# Patient Record
Sex: MALE | Race: BLACK OR AFRICAN AMERICAN | Employment: FULL TIME | URBAN - METROPOLITAN AREA
[De-identification: names, ages, dates, MRNs, and addresses within clinical notes are randomized per-mention and may not be internally consistent; named-entity substitution may affect disease eponyms.]

---

## 2024-06-12 ENCOUNTER — HOSPITAL ENCOUNTER (EMERGENCY)
Facility: CLINIC | Age: 49
Discharge: HOME | End: 2024-06-12
Attending: INTERNAL MEDICINE | Admitting: INTERNAL MEDICINE
Payer: COMMERCIAL

## 2024-06-12 VITALS
SYSTOLIC BLOOD PRESSURE: 164 MMHG | RESPIRATION RATE: 20 BRPM | OXYGEN SATURATION: 98 % | DIASTOLIC BLOOD PRESSURE: 95 MMHG | HEART RATE: 73 BPM | TEMPERATURE: 98.3 F

## 2024-06-12 DIAGNOSIS — R42 DIZZINESS: ICD-10-CM

## 2024-06-12 PROCEDURE — 99282 EMERGENCY DEPT VISIT SF MDM: CPT | Performed by: INTERNAL MEDICINE

## 2024-06-12 PROCEDURE — 99283 EMERGENCY DEPT VISIT LOW MDM: CPT | Performed by: INTERNAL MEDICINE

## 2024-06-12 ASSESSMENT — COLUMBIA-SUICIDE SEVERITY RATING SCALE - C-SSRS
1. IN THE PAST MONTH, HAVE YOU WISHED YOU WERE DEAD OR WISHED YOU COULD GO TO SLEEP AND NOT WAKE UP?: NO
2. HAVE YOU ACTUALLY HAD ANY THOUGHTS OF KILLING YOURSELF IN THE PAST MONTH?: NO
6. HAVE YOU EVER DONE ANYTHING, STARTED TO DO ANYTHING, OR PREPARED TO DO ANYTHING TO END YOUR LIFE?: NO

## 2024-06-12 ASSESSMENT — ACTIVITIES OF DAILY LIVING (ADL)
ADLS_ACUITY_SCORE: 35
ADLS_ACUITY_SCORE: 35

## 2024-06-13 ENCOUNTER — HOSPITAL ENCOUNTER (EMERGENCY)
Facility: CLINIC | Age: 49
Discharge: HOME | End: 2024-06-13
Attending: STUDENT IN AN ORGANIZED HEALTH CARE EDUCATION/TRAINING PROGRAM | Admitting: STUDENT IN AN ORGANIZED HEALTH CARE EDUCATION/TRAINING PROGRAM
Payer: COMMERCIAL

## 2024-06-13 VITALS
SYSTOLIC BLOOD PRESSURE: 138 MMHG | RESPIRATION RATE: 18 BRPM | DIASTOLIC BLOOD PRESSURE: 84 MMHG | HEART RATE: 68 BPM | OXYGEN SATURATION: 99 % | TEMPERATURE: 97.9 F

## 2024-06-13 DIAGNOSIS — H81.10 BENIGN PAROXYSMAL POSITIONAL VERTIGO, UNSPECIFIED LATERALITY: ICD-10-CM

## 2024-06-13 LAB
HOLD SPECIMEN: NORMAL

## 2024-06-13 PROCEDURE — 99284 EMERGENCY DEPT VISIT MOD MDM: CPT | Performed by: STUDENT IN AN ORGANIZED HEALTH CARE EDUCATION/TRAINING PROGRAM

## 2024-06-13 PROCEDURE — 99283 EMERGENCY DEPT VISIT LOW MDM: CPT | Performed by: STUDENT IN AN ORGANIZED HEALTH CARE EDUCATION/TRAINING PROGRAM

## 2024-06-13 RX ORDER — MECLIZINE HYDROCHLORIDE 25 MG/1
25 TABLET ORAL 3 TIMES DAILY PRN
Qty: 30 TABLET | Refills: 0 | Status: SHIPPED | OUTPATIENT
Start: 2024-06-13

## 2024-06-13 ASSESSMENT — ACTIVITIES OF DAILY LIVING (ADL): ADLS_ACUITY_SCORE: 35

## 2024-06-13 ASSESSMENT — COLUMBIA-SUICIDE SEVERITY RATING SCALE - C-SSRS
2. HAVE YOU ACTUALLY HAD ANY THOUGHTS OF KILLING YOURSELF IN THE PAST MONTH?: NO
6. HAVE YOU EVER DONE ANYTHING, STARTED TO DO ANYTHING, OR PREPARED TO DO ANYTHING TO END YOUR LIFE?: NO
1. IN THE PAST MONTH, HAVE YOU WISHED YOU WERE DEAD OR WISHED YOU COULD GO TO SLEEP AND NOT WAKE UP?: NO

## 2024-06-13 NOTE — ED PROVIDER NOTES
[facility] [place] [facility] ([facility])    6/12/24          History     Chief Complaint   Patient presents with    Dizziness     HPI  Magdaleno Gordillo is a 49 year old male who with PMH of adenomyomatosis of gall bladder, BPH, diverticulitis, ADHD, hyperlipidemia and obesity presents to the ED due to dizziness.     Patient reports that he was at ER couple of days ago.  He had gone because his vision was blurry and he had fainted at work.  They reported that his blood sugar was good but his BP was slightly elevated but it had come back to a normal level.  This was possibly due to dehydration so he was put on IV.  He has another appointment on Friday but it is in [facility].      Patient was at [place] today and had gotten dizzy. He was picked up by his son who is homeless.  He had given him a cigarette and had felt lightheaded. He started to smoke again but he had quit last month. Also, patient goes to boxing frequently.  He works out a lot. Last Saturday he was doing a simple workout and was doing leg ups, but he could not catch himself one time.  Patient had anxiety that day and the next day he was going to work.  He got fuzzy and his vision went blurry.      Past Medical History  Past Medical History:   Diagnosis Date    Enlarged prostate     Fatty liver     Gout      Past Surgical History:   Procedure Laterality Date    COLONOSCOPY       No current outpatient medications on file.    Allergies   Allergen Reactions    Penicillins Rash     Family History  History reviewed. No pertinent family history.  Social History   Social History     Tobacco Use    Smoking status: Every Day     Types: Cigarettes    Smokeless tobacco: Never   Substance Use Topics    Alcohol use: Never    Drug use: Never      Past medical history, past surgical history, medications, allergies, family history, and social history were reviewed with the patient. No additional pertinent items.   A complete review of systems  was performed with pertinent positives and negatives noted in the HPI, and all other systems negative.    Physical Exam   BP: (!) 164/95  Pulse: 73  Temp: 98.3  F (36.8  C)  Resp: 20  SpO2: 98 %  Lying Orthostatic BP: 152/83  Lying Orthostatic Pulse: 65 bpm  Sitting Orthostatic BP: 168/93  Sitting Orthostatic Pulse: 70 bpm  Standing Orthostatic BP: 161/101  Standing Orthostatic Pulse: 71 bpm  Physical Exam  Constitutional:       General: He is not in acute distress.     Appearance: He is not diaphoretic.   HENT:      Head: Atraumatic.   Eyes:      General: No scleral icterus.     Pupils: Pupils are equal, round, and reactive to light.   Cardiovascular:      Rate and Rhythm: Normal rate and regular rhythm.      Heart sounds: Normal heart sounds. No murmur heard.     No friction rub. No gallop.   Pulmonary:      Effort: Pulmonary effort is normal. No respiratory distress.      Breath sounds: Normal breath sounds. No stridor. No wheezing, rhonchi or rales.   Chest:      Chest wall: No tenderness.   Abdominal:      General: Abdomen is flat. Bowel sounds are normal. There is no distension.      Palpations: Abdomen is soft. There is no mass.      Tenderness: There is no abdominal tenderness. There is no right CVA tenderness, left CVA tenderness, guarding or rebound.      Hernia: No hernia is present.   Musculoskeletal:         General: No tenderness.   Skin:     General: Skin is warm.      Findings: No rash.   Neurological:      General: No focal deficit present.      Cranial Nerves: No cranial nerve deficit.           ED Course, Procedures, & Data      Procedures            No results found for any visits on 06/12/24.  Medications - No data to display  Labs Ordered and Resulted from Time of ED Arrival to Time of ED Departure - No data to display  No orders to display          Critical care was not performed.     Medical Decision Making  The patient's presentation was of high complexity (an acute health issue posing  potential threat to life or bodily function).    The patient's evaluation involved:  strong consideration of a test (see separate area of note for details) that was ultimately deferred    The patient's management necessitated high risk (but pt decided to go AMA and follow up with his PMD).    Assessment & Plan    Dizziness of unclear etiology, went AMA.    Magdaleno Gordillo has made the decision to leave the current treatment against medical advice.  He has been told that his symptoms may possibly be caused by cardiac CVA other possibly serious pathologies. He has been informed and understands the inherent risks, including death, permanent disability.  He has decided to accept the responsibility for his decision.  He has the capacity to make this informed decision.  He is alert and oriented x 3, understands these instructions, and is able to ambulate.  Magdaleno Gordillo and all necessary parties have been advised that they may return at any time for further evaluation or treatment.     I have reviewed the nursing notes. I have reviewed the findings, diagnosis, plan and need for follow up with the patient.    There are no discharge medications for this patient.      Final diagnoses:   Dizziness   IRACHEL, am serving as a trained medical scribe to document services personally performed by Dominguez Garcia MD, based on the provider's statements to me.     Dominguez JONES MD, was physically present and have reviewed and verified the accuracy of this note documented by RACHEL HUNTLEY.     Dominguez Garcia MD.   [facility] EMERGENCY DEPARTMENT  6/12/2024     Dominguez Garcia MD  06/13/24 0038

## 2024-06-13 NOTE — ED NOTES
Patient reports dizziness that starts when he lies down than sits or stands up. Dizziness lasts about 1-2 minutes. First occurred Sunday.

## 2024-06-13 NOTE — ED PROVIDER NOTES
"      [facility] [place] ([facility])    6/13/24      ED PROVIDER NOTE       History     Chief Complaint   Patient presents with    Dizziness     The history is provided by the patient and medical records.     Magdaleno Gordillo is a 49 year old male with a history of diverticulitis, GERD, HLD, adenomyomatosis of gallbladder, and SNHL of both ears who presents to the ED for dizziness. Patient reports when he is laying down and sits up he feels dizzy as if he is in \"a washing machine\". Patient states that he thought it could be anxiety but worked out again this morning and felt dizzy again. The feelings of dizziness is intermittent. Patient recalls compression of C-7 in the past where arms go numb but has not had symptoms of this since.      Per chart review patient was seen here in ED last night for dizziness. Patient decided to leave treatment despite recommendations to remain in ER.    Past Medical History  Past Medical History:   Diagnosis Date    Enlarged prostate     Fatty liver     Gout      Past Surgical History:   Procedure Laterality Date    COLONOSCOPY       meclizine (ANTIVERT) 25 MG tablet      Allergies   Allergen Reactions    Penicillins Rash     Family History  History reviewed. No pertinent family history.  Social History   Social History     Tobacco Use    Smoking status: Every Day     Types: Cigarettes    Smokeless tobacco: Never   Substance Use Topics    Alcohol use: Never    Drug use: Never      Past medical history, past surgical history, medications, allergies, family history, and social history were reviewed with the patient. No additional pertinent items.   A complete review of systems was performed with pertinent positives and negatives noted in the HPI, and all other systems negative.    Physical Exam   BP: (!) 146/87  Pulse: 51  Temp: 97.9  F (36.6  C)  Resp: 18  SpO2: 98 %  Physical Exam  Constitutional:       General: He is not in acute distress.     Appearance: Normal " appearance. He is not toxic-appearing.   HENT:      Head: Atraumatic.   Eyes:      General: No scleral icterus.     Extraocular Movements: Extraocular movements intact.      Conjunctiva/sclera: Conjunctivae normal.      Pupils: Pupils are equal, round, and reactive to light.   Cardiovascular:      Rate and Rhythm: Normal rate.      Heart sounds: Normal heart sounds.   Pulmonary:      Effort: Pulmonary effort is normal. No respiratory distress.      Breath sounds: Normal breath sounds.   Abdominal:      Palpations: Abdomen is soft.      Tenderness: There is no abdominal tenderness.   Musculoskeletal:         General: No deformity.      Cervical back: Neck supple.   Skin:     General: Skin is warm.   Neurological:      General: No focal deficit present.      Mental Status: He is alert and oriented to person, place, and time. Mental status is at baseline.      Cranial Nerves: No cranial nerve deficit.      Sensory: No sensory deficit.      Motor: No weakness.      Comments: Negative jeet's [facility]           ED Course, Procedures, & Data      Procedures                Results for orders placed or performed during the hospital encounter of 06/13/24   [place] Draw     Status: None ()    Narrative    The following orders were created for panel order [place] Draw.  Procedure                               Abnormality         Status                     ---------                               -----------         ------                     Extra Blue Top Tube[[id number]]                                                         Extra Red Top Tube[[id number]]                                                          Extra Green Top (Lithium...[[id number]]                                                 Extra Purple Top Tube[[id number]]                                                         Please view results for these tests on the individual orders.     Medications - No data to display  Labs Ordered and Resulted from Time of ED  Arrival to Time of ED Departure - No data to display  No orders to display          Critical care was not performed.     Medical Decision Making  The patient's presentation was of high complexity (an acute health issue posing potential threat to life or bodily function).    The patient's evaluation involved:  review of external note(s) from 3+ sources (see separate area of note for details)  strong consideration of a test (labs and MRI brain) that was ultimately deferred    The patient's management necessitated moderate risk (discharge after thorough workup and procedures).    Assessment & Plan    Patient presented to the emergency room for concern for dizziness  As he described the room spinning like he was in a washing machine, and only after movement after sitting up, and that it was not persistent, and as he denied any other neurologic deficits, he is describing likely peripheral vertigo and less likely central vertigo  Although he was told to come back to the emergency room for additional testing after leaving AGAINST MEDICAL ADVICE last night, I do not believe the patient requires any additional testing here in the emergency room as his physical exam is reassuring, and although his Isak-Hallpike maneuver is negative, patient is still describing peripheral vertigo symptoms without any other high risk features so although I considered an MRI of his brain and labs, will defer this workup at this point  Therefore will discharge patient home with prescription for meclizine, he has a primary care appointment today and I encouraged him to follow-up with, and I will give him strict return precautions to come back to the emergency room for any new or worsening symptoms.  Patient also given a Epley maneuver and instructions for symptomatic relief    I have reviewed the nursing notes. I have reviewed the findings, diagnosis, plan and need for follow up with the patient.    New Prescriptions    No medications on file        Final diagnoses:   None   Valerie JONES, am serving as a trained medical scribe to document services personally performed by Brian San MD, based on the provider's statements to me.     Brian JONES MD, was physically present and have reviewed and verified the accuracy of this note documented by Valerie Zhou.     Brian San MD   [facility] EMERGENCY DEPARTMENT  6/13/2024     Brian San MD  06/13/24 1041

## 2024-06-13 NOTE — ED TRIAGE NOTES
"    Patient seen for dizziness last night, did not want to stay, despite recommendations to continue with workup.   Reported he exercises and boxes, did a new maneuver last week, he laid on his back and kicked his feet in the air and reported \"being in a wash machine\"  felt extremely dizzy. Seen a neurologist 10+ years ago, was told his C-7 was compressed, had residual nerve pain down both his arms. Since last week he has had dizziness with head movement.   "

## 2024-06-13 NOTE — ED TRIAGE NOTES
Patient arrives with dizziness and concern for lightheadedness. Seen 3 days ago for same in [place], told he was dehydrated and otherwise normal work up. Dizziness occurs when standing quickly after being seated.     Triage Assessment (Adult)       Row Name 06/12/24 2459          Triage Assessment    Airway WDL WDL        Respiratory WDL    Respiratory WDL WDL        Skin Circulation/Temperature WDL    Skin Circulation/Temperature WDL WDL        Cardiac WDL    Cardiac WDL WDL        Peripheral/Neurovascular WDL    Peripheral Neurovascular WDL WDL        Cognitive/Neuro/Behavioral WDL    Cognitive/Neuro/Behavioral WDL WDL

## 2024-11-06 ENCOUNTER — APPOINTMENT (OUTPATIENT)
Dept: ULTRASOUND IMAGING | Facility: CLINIC | Age: 49
End: 2024-11-06
Attending: EMERGENCY MEDICINE
Payer: COMMERCIAL

## 2024-11-06 ENCOUNTER — HOSPITAL ENCOUNTER (EMERGENCY)
Facility: CLINIC | Age: 49
Discharge: HOME | End: 2024-11-06
Attending: EMERGENCY MEDICINE | Admitting: EMERGENCY MEDICINE
Payer: COMMERCIAL

## 2024-11-06 VITALS
OXYGEN SATURATION: 96 % | TEMPERATURE: 98 F | DIASTOLIC BLOOD PRESSURE: 68 MMHG | RESPIRATION RATE: 18 BRPM | HEART RATE: 57 BPM | SYSTOLIC BLOOD PRESSURE: 125 MMHG

## 2024-11-06 DIAGNOSIS — N34.2 URETHRITIS: ICD-10-CM

## 2024-11-06 DIAGNOSIS — N41.1 PROSTATITIS, CHRONIC: ICD-10-CM

## 2024-11-06 LAB
ALBUMIN UR-MCNC: NEGATIVE MG/DL
ANION GAP SERPL CALCULATED.3IONS-SCNC: 13 MMOL/L (ref 7–15)
APPEARANCE UR: CLEAR
BASOPHILS # BLD AUTO: 0 10E3/UL (ref 0–0.2)
BASOPHILS NFR BLD AUTO: 0 %
BILIRUB UR QL STRIP: NEGATIVE
BUN SERPL-MCNC: 14.4 MG/DL (ref 6–20)
CALCIUM SERPL-MCNC: 9.4 MG/DL (ref 8.8–10.4)
CHLORIDE SERPL-SCNC: 103 MMOL/L (ref 98–107)
COLOR UR AUTO: ABNORMAL
CREAT SERPL-MCNC: 0.83 MG/DL (ref 0.67–1.17)
CRP SERPL-MCNC: <3 MG/L
EGFRCR SERPLBLD CKD-EPI 2021: >90 ML/MIN/1.73M2
EOSINOPHIL # BLD AUTO: 0.3 10E3/UL (ref 0–0.7)
EOSINOPHIL NFR BLD AUTO: 5 %
ERYTHROCYTE [DISTWIDTH] IN BLOOD BY AUTOMATED COUNT: 13.3 % (ref 10–15)
GLUCOSE SERPL-MCNC: 131 MG/DL (ref 70–99)
GLUCOSE UR STRIP-MCNC: NEGATIVE MG/DL
HCO3 SERPL-SCNC: 22 MMOL/L (ref 22–29)
HCT VFR BLD AUTO: 49.4 % (ref 40–53)
HGB BLD-MCNC: 16.6 G/DL (ref 13.3–17.7)
HGB UR QL STRIP: NEGATIVE
IMM GRANULOCYTES # BLD: 0 10E3/UL
IMM GRANULOCYTES NFR BLD: 0 %
KETONES UR STRIP-MCNC: NEGATIVE MG/DL
LEUKOCYTE ESTERASE UR QL STRIP: NEGATIVE
LYMPHOCYTES # BLD AUTO: 3.3 10E3/UL (ref 0.8–5.3)
LYMPHOCYTES NFR BLD AUTO: 43 %
MCH RBC QN AUTO: 29.7 PG (ref 26.5–33)
MCHC RBC AUTO-ENTMCNC: 33.6 G/DL (ref 31.5–36.5)
MCV RBC AUTO: 88 FL (ref 78–100)
MONOCYTES # BLD AUTO: 0.4 10E3/UL (ref 0–1.3)
MONOCYTES NFR BLD AUTO: 6 %
NEUTROPHILS # BLD AUTO: 3.4 10E3/UL (ref 1.6–8.3)
NEUTROPHILS NFR BLD AUTO: 46 %
NITRATE UR QL: NEGATIVE
NRBC # BLD AUTO: 0 10E3/UL
NRBC BLD AUTO-RTO: 0 /100
PH UR STRIP: 7 [PH] (ref 5–7)
PLATELET # BLD AUTO: 189 10E3/UL (ref 150–450)
POTASSIUM SERPL-SCNC: 4.4 MMOL/L (ref 3.4–5.3)
RBC # BLD AUTO: 5.59 10E6/UL (ref 4.4–5.9)
RBC URINE: 1 /HPF
SODIUM SERPL-SCNC: 138 MMOL/L (ref 135–145)
SP GR UR STRIP: 1.02 (ref 1–1.03)
SQUAMOUS EPITHELIAL: 4 /HPF
UROBILINOGEN UR STRIP-MCNC: NORMAL MG/DL
WBC # BLD AUTO: 7.5 10E3/UL (ref 4–11)
WBC URINE: 2 /HPF

## 2024-11-06 PROCEDURE — 99284 EMERGENCY DEPT VISIT MOD MDM: CPT | Mod: 25 | Performed by: EMERGENCY MEDICINE

## 2024-11-06 PROCEDURE — 87086 URINE CULTURE/COLONY COUNT: CPT | Performed by: EMERGENCY MEDICINE

## 2024-11-06 PROCEDURE — 80048 BASIC METABOLIC PNL TOTAL CA: CPT | Performed by: EMERGENCY MEDICINE

## 2024-11-06 PROCEDURE — 36415 COLL VENOUS BLD VENIPUNCTURE: CPT | Performed by: EMERGENCY MEDICINE

## 2024-11-06 PROCEDURE — 86140 C-REACTIVE PROTEIN: CPT | Performed by: EMERGENCY MEDICINE

## 2024-11-06 PROCEDURE — 76870 US EXAM SCROTUM: CPT | Mod: 26 | Performed by: RADIOLOGY

## 2024-11-06 PROCEDURE — 93976 VASCULAR STUDY: CPT

## 2024-11-06 PROCEDURE — 85004 AUTOMATED DIFF WBC COUNT: CPT | Performed by: EMERGENCY MEDICINE

## 2024-11-06 PROCEDURE — 99284 EMERGENCY DEPT VISIT MOD MDM: CPT | Performed by: EMERGENCY MEDICINE

## 2024-11-06 PROCEDURE — 81003 URINALYSIS AUTO W/O SCOPE: CPT | Performed by: STUDENT IN AN ORGANIZED HEALTH CARE EDUCATION/TRAINING PROGRAM

## 2024-11-06 PROCEDURE — 99207 US TESTICULAR AND SCROTUM WITH DOPPLER LIMITED: CPT | Mod: 26 | Performed by: RADIOLOGY

## 2024-11-06 RX ORDER — SULFAMETHOXAZOLE AND TRIMETHOPRIM 800; 160 MG/1; MG/1
1 TABLET ORAL 2 TIMES DAILY
Qty: 28 TABLET | Refills: 0 | Status: SHIPPED | OUTPATIENT
Start: 2024-11-06 | End: 2024-11-20

## 2024-11-06 ASSESSMENT — ACTIVITIES OF DAILY LIVING (ADL)
ADLS_ACUITY_SCORE: 0

## 2024-11-06 ASSESSMENT — COLUMBIA-SUICIDE SEVERITY RATING SCALE - C-SSRS
1. IN THE PAST MONTH, HAVE YOU WISHED YOU WERE DEAD OR WISHED YOU COULD GO TO SLEEP AND NOT WAKE UP?: NO
6. HAVE YOU EVER DONE ANYTHING, STARTED TO DO ANYTHING, OR PREPARED TO DO ANYTHING TO END YOUR LIFE?: NO
2. HAVE YOU ACTUALLY HAD ANY THOUGHTS OF KILLING YOURSELF IN THE PAST MONTH?: NO

## 2024-11-06 NOTE — ED TRIAGE NOTES
Pt arrives ambulatory to triage with c/o of genital pain. Pt reports he was seen multiple times in [facility] with no answers. Pt reports they found hardening of rectum and performed exploratory surgery only to find nothing wrong. Pt noted his prostate was found to be slightly enlarged, but not to an extreme. Urologist told pt to come here for MRI.

## 2024-11-06 NOTE — ED PROVIDER NOTES
[place] EMERGENCY DEPARTMENT ([facility])    11/06/24       ED PROVIDER NOTE     History     Chief Complaint   Patient presents with    Testicular/scrotal Pain     The history is provided by the patient and medical records.     Magdaleno Gordillo is a 49 year old male with a history of PFO, obesity, prediabetes, obstructive sleep apnea, benign prostatic hyperplasia, obstructive voiding issues, recurrent urethritis/prostatitis who presents to the emergency department with penile pain.    Per chart review, patient presented to [place] urgent care on 9/18 with urinary frequency, urgency, and dysuria.  He he had positive leukocyte esterase, but no culture was performed.  He was then seen again at urgent care on 10/1 with left lower quadrant pain and dysuria.  CT abdomen pelvis showed possible cholelithiasis and rectal mucosal thickening and recommended colonoscopy.  Colonoscopy on 10/28 revealed diverticulosis, but otherwise normal.  He also had an office visit with urology on 10/28 and was prescribed Flomax to relax prostate and relieve voiding symptoms and doxycycline for suspected nongonococcal urethritis. Prior to these visits he had elevated PSA on 6/14/24 at 8.65, but on repeat on 9/03/24 was normal.     Here in the ED, patient reports he is here because [facility] has more specialists here than other places and he has lost trust in [facility] and is requesting a referral for a specialist here.  Patient reports that a month ago he started experiencing pain at the tip of the penis near the urethra.  He states he was seen in urgent care twice, and had unremarkable lab work the first time and a CT scan which showed hardening mucus around the rectum the second time.  He states that he saw a urologist twice and was prescribed antibiotics and Flomax, but decided not to take them because he was not found to have an infection.    He reports that his penile pain is  intermittent and burning and sitting and stretching can trigger it.  When he has episodes of pain it can last hours.  He believes the pain level is stayed the same the past month.  Patient states the pain does radiate to his testicles.  He denies abdominal or rectal pain.  He denies hematuria, change in stools. Of note, patient did try some home remedies including rubbing coconut oil and oil of oregano on the tip of his penis.  He denies any scratches or lesions to his penis.  Patient reports that he is concerned for cancer or something serious. No fevers or other symptoms.       COLONOSCOPY RESULTS (10/28/2024)  - Preparation of the colon was fair.  - Diverticulosis in the sigmoid colon.  - The examination was otherwise normal.   Past Medical History  Past Medical History:   Diagnosis Date    Enlarged prostate     Fatty liver     Gout      Past Surgical History:   Procedure Laterality Date    COLONOSCOPY       meclizine (ANTIVERT) 25 MG tablet  sulfamethoxazole-trimethoprim (BACTRIM DS) 800-160 MG tablet      Allergies   Allergen Reactions    Ciprofloxacin Other (See Comments)     Suicidal    Penicillins Rash     Family History  No family history on file.  Social History   Social History     Tobacco Use    Smoking status: Every Day     Types: Cigarettes    Smokeless tobacco: Never   Substance Use Topics    Alcohol use: Never    Drug use: Never      A medically appropriate review of systems was performed with pertinent positives and negatives noted in the HPI, and all other systems negative.    Physical Exam   BP: 125/68  Pulse: 57  Temp: 98  F (36.7  C)  Resp: 18  SpO2: 96 %  Physical Exam  General: awake, alert, NAD  Head: normal cephalic  HEENT: pupils equal, conjugate gaze intact  Neck: Supple  CV: regular rate   Lungs: clear to auscultation  Abd: soft, non-tender, no guarding, no peritoneal signs  : No penile rashes or lesions noted.  No suprapubic tenderness.  No testicular tenderness masses or lesions.  No  hernias appreciated.  Patient does have prostate tenderness on rectal exam.  No bleeding.  EXT: lower extremities without swelling or edema  Neuro: awake, answers questions appropriately. No focal deficits noted       ED Course, Procedures, & Data      Procedures         Results for orders placed or performed during the hospital encounter of 11/06/24   US Testicular & Scrotum w Doppler Ltd     Status: None    Narrative    EXAMINATION: US TESTICULAR AND SCROTAL, 11/6/2024 3:01 PM     COMPARISON: None.    HISTORY: penile and testicular pain    TECHNIQUE: The scrotum was scanned in standard fashion with  specialized ultrasound transducer(s) using grey scale, color Doppler,  and spectral flow  techniques.    Findings:  The testes demonstrate normal and symmetric echotexture and  vascularity. No evidence of a focal lesion.  The right testicle  measures 4.4 x 2.8 x 2.2 cm (14.2 mL) and the left measures 4.1 x 3.3  x 2.2 cm (15.6 mL). There is no evidence of torsion.    There is no evidence of a significant hydrocele or varicocele.    Bilateral anechoic avascular cysts of the epididymal head measuring up  to 4 mm on the right and 5 mm on the left.      Impression    Impression:   1.  No acute finding.  2.  Bilateral epididymal head cysts measuring up to 5 mm.    I have personally reviewed the examination and initial interpretation  and I agree with the findings.    RICKY GOINS MD         SYSTEM ID:  [id number]    with Microscopic reflex to Culture     Status: Abnormal    Specimen: Urine, Midstream   Result Value Ref Range    Color Urine Light Yellow Colorless, Straw, Light Yellow, Yellow    Appearance Urine Clear Clear    Glucose Urine Negative Negative mg/dL    Bilirubin Urine Negative Negative    Ketones Urine Negative Negative mg/dL    Specific Gravity Urine 1.021 1.003 - 1.035    Blood Urine Negative Negative    pH Urine 7.0 5.0 - 7.0    Protein Albumin Urine Negative Negative mg/dL    Urobilinogen Urine Normal  Normal, 2.0 mg/dL    Nitrite Urine Negative Negative    Leukocyte Esterase Urine Negative Negative    RBC Urine 1 <=2 /HPF    WBC Urine 2 <=5 /HPF    Squamous Epithelials Urine 4 (H) <=1 /HPF    Narrative    Urine Culture not indicated   Basic metabolic panel     Status: Abnormal   Result Value Ref Range    Sodium 138 135 - 145 mmol/L    Potassium 4.4 3.4 - 5.3 mmol/L    Chloride 103 98 - 107 mmol/L    Carbon Dioxide (CO2) 22 22 - 29 mmol/L    Anion Gap 13 7 - 15 mmol/L    Urea Nitrogen 14.4 6.0 - 20.0 mg/dL    Creatinine 0.83 0.67 - 1.17 mg/dL    GFR Estimate >90 >60 mL/min/1.73m2    Calcium 9.4 8.8 - 10.4 mg/dL    Glucose 131 (H) 70 - 99 mg/dL   CRP inflammation     Status: Normal   Result Value Ref Range    CRP Inflammation <3.00 <5.00 mg/L   CBC with platelets and differential     Status: None   Result Value Ref Range    WBC Count 7.5 4.0 - 11.0 10e3/uL    RBC Count 5.59 4.40 - 5.90 10e6/uL    Hemoglobin 16.6 13.3 - 17.7 g/dL    Hematocrit 49.4 40.0 - 53.0 %    MCV 88 78 - 100 fL    MCH 29.7 26.5 - 33.0 pg    MCHC 33.6 31.5 - 36.5 g/dL    RDW 13.3 10.0 - 15.0 %    Platelet Count 189 150 - 450 10e3/uL    % Neutrophils 46 %    % Lymphocytes 43 %    % Monocytes 6 %    % Eosinophils 5 %    % Basophils 0 %    % Immature Granulocytes 0 %    NRBCs per 100 WBC 0 <1 /100    Absolute Neutrophils 3.4 1.6 - 8.3 10e3/uL    Absolute Lymphocytes 3.3 0.8 - 5.3 10e3/uL    Absolute Monocytes 0.4 0.0 - 1.3 10e3/uL    Absolute Eosinophils 0.3 0.0 - 0.7 10e3/uL    Absolute Basophils 0.0 0.0 - 0.2 10e3/uL    Absolute Immature Granulocytes 0.0 <=0.4 10e3/uL    Absolute NRBCs 0.0 10e3/uL   CBC with platelets differential     Status: None    Narrative    The following orders were created for panel order CBC with platelets differential.  Procedure                               Abnormality         Status                     ---------                               -----------         ------                     CBC with platelets and  d...[[id number]]                      Final result                 Please view results for these tests on the individual orders.     Medications - No data to display  Labs Ordered and Resulted from Time of ED Arrival to Time of ED Departure   ROUTINE UA WITH MICROSCOPIC REFLEX TO CULTURE - Abnormal       Result Value    Color Urine Light Yellow      Appearance Urine Clear      Glucose Urine Negative      Bilirubin Urine Negative      Ketones Urine Negative      Specific Gravity Urine 1.021      Blood Urine Negative      pH Urine 7.0      Protein Albumin Urine Negative      Urobilinogen Urine Normal      Nitrite Urine Negative      Leukocyte Esterase Urine Negative      RBC Urine 1      WBC Urine 2      Squamous Epithelials Urine 4 (*)    BASIC METABOLIC PANEL - Abnormal    Sodium 138      Potassium 4.4      Chloride 103      Carbon Dioxide (CO2) 22      Anion Gap 13      Urea Nitrogen 14.4      Creatinine 0.83      GFR Estimate >90      Calcium 9.4      Glucose 131 (*)    CRP INFLAMMATION - Normal    CRP Inflammation <3.00     CBC WITH PLATELETS AND DIFFERENTIAL    WBC Count 7.5      RBC Count 5.59      Hemoglobin 16.6      Hematocrit 49.4      MCV 88      MCH 29.7      MCHC 33.6      RDW 13.3      Platelet Count 189      % Neutrophils 46      % Lymphocytes 43      % Monocytes 6      % Eosinophils 5      % Basophils 0      % Immature Granulocytes 0      NRBCs per 100 WBC 0      Absolute Neutrophils 3.4      Absolute Lymphocytes 3.3      Absolute Monocytes 0.4      Absolute Eosinophils 0.3      Absolute Basophils 0.0      Absolute Immature Granulocytes 0.0      Absolute NRBCs 0.0     URINE CULTURE     US Testicular & Scrotum w Doppler Ltd   Final Result   Impression:    1.  No acute finding.   2.  Bilateral epididymal head cysts measuring up to 5 mm.      I have personally reviewed the examination and initial interpretation   and I agree with the findings.      RICKY GOINS MD            SYSTEM ID:  [id number]              Critical care was not performed.     Medical Decision Making  The patient's presentation was of moderate complexity (a chronic illness mild to moderate exacerbation, progression, or side effect of treatment).    The patient's evaluation involved:  review of external note(s) from 3+ sources (see separate area of note for details)  review of 3+ test result(s) ordered prior to this encounter (see separate area of note for details)  ordering and/or review of 3+ test(s) in this encounter (see separate area of note for details)    The patient's management necessitated moderate risk (prescription drug management including medications given in the ED).    Assessment & Plan    Magdaleno is a 49-year-old male who presents with intermittent penile pain for 1 month following both urology and PCP evaluation.  I did review patient's charts and documentation.  Patient's gonorrhea chlamydia was negative though urology wanted to treat for possible urethritis with doxycycline, patient did not take this medication.  He was noted to have an elevated PSA previously but was normal on recheck last month plan is to have this rechecked again in December.  Patient had a CT abdomen pelvis that did not note any abnormalities in the prostate excetra.  Patient has not had an ultrasound so I did perform a scrotal ultrasound which did not show any acute findings.  His urine here had squamous cells but no other signs of infection though a culture is pending.  His labs are otherwise reassuring with a normal white count, no left shift.  He is denying systemic symptoms such as fever, belly pain, he is well-appearing.  Given the prostate tenderness on exam I will treat for a possible chronic prostatitis in addition to encouraged him to take the doxycycline for possible urethritis. UC pending.  Discussed case with pharmacy he reports an allergy to Cipro so we will treat with Bactrim to make sure we get good penetration of the prostate.   Encouraged that he continue to follow-up with urology, will put in for referral for our urology as patient expressed desire for second opinion.    Express if he develops fever, abdominal pain new or worsening symptoms he should return to the ER for repeat evaluation.        I have reviewed the nursing notes. I have reviewed the findings, diagnosis, plan and need for follow up with the patient.    Discharge Medication List as of 11/6/2024  4:01 PM        START taking these medications    Details   sulfamethoxazole-trimethoprim (BACTRIM DS) 800-160 MG tablet Take 1 tablet by mouth 2 times daily for 14 days., Disp-28 tablet, R-0, E-Prescribe             Final diagnoses:   Urethritis   Prostatitis, chronic          [facility] EMERGENCY DEPARTMENT  11/6/2024     Michelet Alvarez MD  11/06/24 192

## 2024-11-06 NOTE — DISCHARGE INSTRUCTIONS
trial of doxycycline 100 mg twice daily x 7 days; will add on Bactrim to also cover prostate infection    Please make an appointment to follow up with Urology Clinic (phone: [phone]) as soon as possible.      Your evaluation here is reassuring you have a normal white count and your urine does not look frankly infected.  You do have tenderness on your prostate exam I have reviewed the note from your urologist who recommended trialing doxycycline for inflamed prostate.  I think this is in a reasonable attempt.  He also noted that your PSA has normalized and he would like it checked again next month.  I can put in a referral for [facility] urology follow-up but I do not see an emergent cause for your symptoms or need for emergent further emergent testing or treatment today

## 2024-11-07 LAB — BACTERIA UR CULT: NORMAL

## 2025-01-08 NOTE — TELEPHONE ENCOUNTER
MEDICAL RECORDS REQUEST   [facility]  Urology Clinic  [address] [place] [address]  PHONE: [phone]  Fax: [phone]        FUTURE VISIT INFORMATION                                                   Magdaleno L Rand, : 1975 scheduled for future visit at [facility]    APPOINTMENT INFORMATION:  Date: 25 @ 9:45 am  Provider:  Aamir Terry PA-C   Reason for Visit/Diagnosis: urethritis     REFERRAL INFORMATION:  Referring provider:  Michelet Alvarez MD   Specialty: Emergency Medicine  Referring providers clinic:  [facility] ED  Clinic contact number:  [phone]     RECORDS REQUESTED FOR VISIT                                                     NOTES  STATUS/DETAILS   OFFICE NOTE from referring provider  yes  ED Referral    OFFICE NOTE from other specialist  yes  24, 10/28/24, 9/3/24 Bryant Bray, APRN-CNP  @[facility]    10/1/24, 24 Aruna Vásquez APRN-CNP  @[facility]     DISCHARGE REPORT from the ER  yes  24 Michelet Alvarez MD @[facility] ED     MEDICATION LIST  yes   LABS     URINALYSIS (UA)  yes   URINE CYTOLOGY  yes   BENIGN PROSTATIC HYPERPLASIA (BPH)     CT ABDOMEN/PELVIS (IMAGES & REPORT)  in process  [facility]*  10/1/24 CT Abdomen Pelvis  21 CT Abdomen Pelvis  3/2/22 CT Abdomen Pelvis     TRUS (REPORT & IMAGES IF AVAILABLE)  in process  [facility]  24 US Testicular & Scrotum Doppler    [facility]*  24 US Abdomen  3/27/23 US Abdomen     PSA  yes     PRE-VISIT CHECKLIST      Joint diagnostic appointment coordinated correctly          (ensure right order & amount of time) Yes   RECORD COLLECTION COMPLETE If no, please explain Imaging pending     Records Requested     2025 2:13 PM   [id number]   Facility  [facility]   Outcome 2:15 pm Sent request for imaging to be pushed to PACS. KALYANI

## 2025-02-11 NOTE — PROGRESS NOTES
Virtual Visit Details    Type of service:  Video Visit   Video Start Time:  9:40 AM  Video End Time:10:11 AM    Originating Location (pt. Location): Home    Distant Location (provider location):  Off-site  Platform used for Video Visit: [facility]    Visit conducted via real-time audio/video technology by Aamir Terry PA-C to the patient in their home.    Subjective      REFERRING PROVIDER  Michelet Alvarez MD    REASON FOR VISIT  Pelvic pain second opinion     HISTORY OF PRESENT ILLNESS  Mr. Gordillo is a 49 year old male when speaking with today for second opinion regarding his pelvic pain.  His other medical history is significant for PFO, obesity, prediabetes, erectile dysfunction, BPH, obstructive sleep apnea, and tobacco use.  I personally reviewed the outside urology documentation most recently from 12/5/2024 as well as the emergency department documentation from 11/6/2024.    It appears that Magdaleno initially met with urology in September 2024 due to an isolated elevated PSA.  Repeat PSA testing showed a drastic drop back to reassuring baseline levels, however he was seen again in October due to worsening pelvic pain.  Testing did not reveal any specific etiology, but symptoms were significantly improved with Flomax and a course of doxycycline.  He unfortunately ended up in the emergency department on 11/6/2024 for continued symptoms and no desire to have a second opinion but after moving forward with the recommendations as stated by his urology team, above symptoms did improve.  It appears he was potentially also given a separate course of antibiotics in the emergency department that seem to help as well.  As of the most recent urology visit on 12/5/2024, symptoms were much improved but still wanted to follow through with a second opinion visit.    Completed a colonoscopy for the CT irregularity on 10/28/2024 which did not show any concerning findings.     Today:  Confirms the above history, but states  that there was some interpersonal issues as well  Was put on ciprofloxacin and bactrim about one month ago and feels the two week courses helped quite a bit   Still having some hesitancy   Never took the tamsulosin  Some intermittent nocturia over the last couple of years   No history of gross hematuria   High stress job  States he does not urinate very much at all    SOCIAL HISTORY  Former smoker, quit just recently    FAMILY HISTORY   Denies any known family history of urologic malignancy     REVIEW OF SYSTEMS   Review of Systems   Constitutional:  Negative for fatigue and unexpected weight change.   HENT:  Negative for hearing loss.    Eyes:  Negative for visual disturbance.   Respiratory:  Negative for shortness of breath.    Cardiovascular:  Negative for chest pain (Related to heartburn).   Gastrointestinal:  Negative for constipation.   Genitourinary:  Negative for hematuria.   Musculoskeletal:  Positive for back pain (Lots of lower back pain, sees PT).   Neurological:  Negative for dizziness and light-headedness.   Hematological:  Negative for adenopathy.   Psychiatric/Behavioral:  Positive for sleep disturbance (Nocturia).       Objective      PHYSICAL EXAMINATION  Deferred given virtual visit.    LABS   Latest Reference Range & Units 11/06/24 12:31   Color Urine Colorless, Straw, Light Yellow, Yellow  Light Yellow   Appearance Urine Clear  Clear   Glucose Urine Negative mg/dL Negative   Bilirubin Urine Negative  Negative   Ketones Urine Negative mg/dL Negative   Specific Gravity Urine 1.003 - 1.035  1.021   pH Urine 5.0 - 7.0  7.0   Protein Albumin Urine Negative mg/dL Negative   Urobilinogen mg/dL Normal, 2.0 mg/dL Normal   Nitrite Urine Negative  Negative   Blood Urine Negative  Negative   Leukocyte Esterase Urine Negative  Negative   WBC Urine <=5 /HPF 2   RBC Urine <=2 /HPF 1   Squamous Epithelial /HPF Urine <=1 /HPF 4 (H)   (H): Data is abnormally high    URINALYSIS REFLEX TO CULTURE (URINE DIP, REFLEX  TO MICROSCOPIC, REFLEX TO CULTURE)  Specimen: Urine - Urine specimen obtained by clean catch procedure (specimen)  Component  Ref Range & Units 4 mo ago   Color Urine  Marg[place], Dark Yellow, Straw, Yellow, Colorless Yellow   Clarity Urine  Clear Clear   Glucose Urine  Negative Negative   Bilirubin Urine  Negative Negative   Ketones Urine  Negative, 5 mg/dL, 10 mg/dL Negative   Specific Gravity  1.002 - 1.030 <=1.005   Blood Urine  Negative Negative   PH Urine  5.0, 5.5, 6.0, 6.5, 7.0, 7.5, 8.0 5.5   Protein Urine  Negative Negative   Urobilinogen  < 2 mg/dL < 2 mg/dL   Nitrite  Negative Negative   Leukocyte Esterase Urine  Negative Trace Abnormal      Outside PSA testin2024: 0.99  9/3/2024: 0.86  2024: 8.65  2022: 1.19    IMAGING  CT scan below did not show any specific prostatic etiologies for symptoms, but did show the rectal abnormality that ultimately was just due to peristalsis    CT ABDOMEN PELVIS WITH CONTRAST    Anatomical Region Laterality Modality   Abdomen -- Computed Tomography   Pelvis -- --     Narrative    EXAM: CT ABDOMEN PELVIS WITH CONTRAST    INDICATION: ICD-10 R10.32 LLQ pain    COMPARISON: CT abdomen pelvis 2022.    TECHNIQUE: CT of the abdomen and pelvis was performed with IV contrast.    FINDINGS: The lung bases are unremarkable.    The liver is grossly unremarkable. There is evidence of cholelithiasis. The kidneys are grossly unremarkable. There is hyperdense contrast in the renal collecting systems which limits evaluation for stones. The pancreas, adrenal glands, and spleen are unremarkable.    The small bowel is normal caliber. No evidence of obstruction. The appendix is identified and appears normal. There is extensive diverticulosis of the colon without evidence of diverticulitis. There is nonspecific mucosal irregularity of the rectum.    The abdominal aorta is normal caliber. There is moderate atherosclerotic vascular disease. There is no retroperitoneal or pelvic  lymphadenopathy.    No lytic or blastic osseus lesions are identified.      IMPRESSION:  1. Colonic diverticulosis without evidence of diverticulitis.  2. Mucosal irregularity of the rectum may relate to peristalsis or underdistention. Mucosal lesion not excluded on CT. Recommend correlation with colonoscopy.  3. Cholelithiasis.    Narrative & Impression   EXAMINATION: US TESTICULAR AND SCROTAL, 11/6/2024 3:01 PM      COMPARISON: None.     HISTORY: penile and testicular pain     TECHNIQUE: The scrotum was scanned in standard fashion with  specialized ultrasound transducer(s) using grey scale, color Doppler,  and spectral flow  techniques.     Findings:  The testes demonstrate normal and symmetric echotexture and  vascularity. No evidence of a focal lesion.  The right testicle  measures 4.4 x 2.8 x 2.2 cm (14.2 mL) and the left measures 4.1 x 3.3  x 2.2 cm (15.6 mL). There is no evidence of torsion.     There is no evidence of a significant hydrocele or varicocele.     Bilateral anechoic avascular cysts of the epididymal head measuring up  to 4 mm on the right and 5 mm on the left.                                                                      Impression:   1.  No acute finding.  2.  Bilateral epididymal head cysts measuring up to 5 mm.        Assessment & Plan    Prostatitis   Urethral pain  High stress  Low fluid intake    It was my pleasure to speak with Jonathon in regards to his presumed prostatitis infections and urethral pain in the setting of a high stress job and low fluid intake.  After reviewing his clinical history as well as his multiple lab and imaging tests, I was happy to hear that that Jonathon seems to be past the worst of the symptoms.  We reviewed different potential etiologies of his symptoms, specifically noting that there is a blurry line between prostatitis and pelvic floor dysfunction, and we really need to be diligent on asking the right questions and looking at the overall picture to  determine which of these is more likely.    We then reviewed his lifestyle in general, and that he does have a relatively high stress job and does not appear to be taking in many fluids during the day or urinating much during the day.  These can raise his risks for both prostatitis and pelvic floor dysfunction, so my first recommendation is that he work on these things specifically to help eliminate potential reasons for his symptoms.  In the future, we could consider trials of tamsulosin as was previously recommended or a repeat course of antibiotics, potentially even up to 6 weeks.  Finally, at some point in the future we may need to consider a referral to pelvic floor physical therapy.    Jonathon asked multiple insightful questions, but ultimately expressed understanding and agreement with the above discussion and plan.  He will set up a MyChart so we can stay in contact that way, and we will determine when follow-up is needed based on if he remains minimally symptomatic or if symptoms get worse.    PLAN  Lifestyle modifications for hydration and timed voiding  Future consideration of tamsulosin or repeat antibiotic usage  Future consideration of referral to pelvic floor physical therapy    SIGNED    Aamir Terry PA-C      I spent a total of 36 minutes spent on the date of the encounter doing chart review, history and exam, documentation, and further activities as noted above.

## 2025-02-13 ENCOUNTER — PRE VISIT (OUTPATIENT)
Dept: UROLOGY | Facility: CLINIC | Age: 50
End: 2025-02-13

## 2025-02-13 ENCOUNTER — VIRTUAL VISIT (OUTPATIENT)
Dept: UROLOGY | Facility: CLINIC | Age: 50
End: 2025-02-13
Attending: EMERGENCY MEDICINE
Payer: COMMERCIAL

## 2025-02-13 DIAGNOSIS — N34.2 URETHRITIS: ICD-10-CM

## 2025-02-13 ASSESSMENT — ENCOUNTER SYMPTOMS
BACK PAIN: 1
ADENOPATHY: 0
CONSTIPATION: 0
SHORTNESS OF BREATH: 0
FATIGUE: 0
LIGHT-HEADEDNESS: 0
SLEEP DISTURBANCE: 1
UNEXPECTED WEIGHT CHANGE: 0
DIZZINESS: 0
HEMATURIA: 0

## 2025-02-13 ASSESSMENT — PAIN SCALES - GENERAL: PAINLEVEL_OUTOF10: NO PAIN (0)

## 2025-02-13 NOTE — NURSING NOTE
Current patient location: Patient declined to provide     Is the patient currently in the state of [place]? YES    Visit mode: VIDEO    If the visit is dropped, the patient can be reconnected by:VIDEO VISIT: Text to cell phone:   Telephone Information:   Mobile [phone]       Will anyone else be joining the visit? NO  (If patient encounters technical issues they should call [phone]    Are changes needed to the allergy or medication list? No    Are refills needed on medications prescribed by this physician? NO    Rooming Documentation:  Questionnaire(s) not done per department protocol    Reason for visit: Consult    Shelby Kocher VVF

## 2025-02-13 NOTE — LETTER
2/13/2025       RE: Magdaleno Gordillo  [id number]  [facility] [address]     Dear Colleague,    Thank you for referring your patient, Magdaleno Gordillo, to the [facility] [place] [facility]. Please see a copy of my visit note below.    Virtual Visit Details    Type of service:  Video Visit   Video Start Time:  9:40 AM  Video End Time:10:11 AM    Originating Location (pt. Location): Home    Distant Location (provider location):  Off-site  Platform used for Video Visit: [facility]    Visit conducted via real-time audio/video technology by Aamir Terry PA-C to the patient in their home.    Subjective     REFERRING PROVIDER  Michelet Alvarez MD    REASON FOR VISIT  Pelvic pain second opinion     HISTORY OF PRESENT ILLNESS  Mr. Gordillo is a 49 year old male when speaking with today for second opinion regarding his pelvic pain.  His other medical history is significant for PFO, obesity, prediabetes, erectile dysfunction, BPH, obstructive sleep apnea, and tobacco use.  I personally reviewed the outside urology documentation most recently from 12/5/2024 as well as the emergency department documentation from 11/6/2024.    It appears that Magdaleno initially met with urology in September 2024 due to an isolated elevated PSA.  Repeat PSA testing showed a drastic drop back to reassuring baseline levels, however he was seen again in October due to worsening pelvic pain.  Testing did not reveal any specific etiology, but symptoms were significantly improved with Flomax and a course of doxycycline.  He unfortunately ended up in the emergency department on 11/6/2024 for continued symptoms and no desire to have a second opinion but after moving forward with the recommendations as stated by his urology team, above symptoms did improve.  It appears he was potentially also given a separate course of antibiotics in the emergency department that seem to help as  well.  As of the most recent urology visit on 12/5/2024, symptoms were much improved but still wanted to follow through with a second opinion visit.    Completed a colonoscopy for the CT irregularity on 10/28/2024 which did not show any concerning findings.     Today:  Confirms the above history, but states that there was some interpersonal issues as well  Was put on ciprofloxacin and bactrim about one month ago and feels the two week courses helped quite a bit   Still having some hesitancy   Never took the tamsulosin  Some intermittent nocturia over the last couple of years   No history of gross hematuria   High stress job  States he does not urinate very much at all    SOCIAL HISTORY  Former smoker, quit just recently    FAMILY HISTORY   Denies any known family history of urologic malignancy     REVIEW OF SYSTEMS   Review of Systems   Constitutional:  Negative for fatigue and unexpected weight change.   HENT:  Negative for hearing loss.    Eyes:  Negative for visual disturbance.   Respiratory:  Negative for shortness of breath.    Cardiovascular:  Negative for chest pain (Related to heartburn).   Gastrointestinal:  Negative for constipation.   Genitourinary:  Negative for hematuria.   Musculoskeletal:  Positive for back pain (Lots of lower back pain, sees PT).   Neurological:  Negative for dizziness and light-headedness.   Hematological:  Negative for adenopathy.   Psychiatric/Behavioral:  Positive for sleep disturbance (Nocturia).       Objective     PHYSICAL EXAMINATION  Deferred given virtual visit.    LABS   Latest Reference Range & Units 11/06/24 12:31   Color Urine Colorless, Straw, Light Yellow, Yellow  Light Yellow   Appearance Urine Clear  Clear   Glucose Urine Negative mg/dL Negative   Bilirubin Urine Negative  Negative   Ketones Urine Negative mg/dL Negative   Specific Gravity Urine 1.003 - 1.035  1.021   pH Urine 5.0 - 7.0  7.0   Protein Albumin Urine Negative mg/dL Negative   Urobilinogen mg/dL  Normal, 2.0 mg/dL Normal   Nitrite Urine Negative  Negative   Blood Urine Negative  Negative   Leukocyte Esterase Urine Negative  Negative   WBC Urine <=5 /HPF 2   RBC Urine <=2 /HPF 1   Squamous Epithelial /HPF Urine <=1 /HPF 4 (H)   (H): Data is abnormally high    URINALYSIS REFLEX TO CULTURE (URINE DIP, REFLEX TO MICROSCOPIC, REFLEX TO CULTURE)  Specimen: Urine - Urine specimen obtained by clean catch procedure (specimen)  Component  Ref Range & Units 4 mo ago   Color Urine  [place], Dark Yellow, Straw, Yellow, Colorless Yellow   Clarity Urine  Clear Clear   Glucose Urine  Negative Negative   Bilirubin Urine  Negative Negative   Ketones Urine  Negative, 5 mg/dL, 10 mg/dL Negative   Specific Gravity  1.002 - 1.030 <=1.005   Blood Urine  Negative Negative   PH Urine  5.0, 5.5, 6.0, 6.5, 7.0, 7.5, 8.0 5.5   Protein Urine  Negative Negative   Urobilinogen  < 2 mg/dL < 2 mg/dL   Nitrite  Negative Negative   Leukocyte Esterase Urine  Negative Trace Abnormal      Outside PSA testin2024: 0.99  9/3/2024: 0.86  2024: 8.65  2022: 1.19    IMAGING  CT scan below did not show any specific prostatic etiologies for symptoms, but did show the rectal abnormality that ultimately was just due to peristalsis    CT ABDOMEN PELVIS WITH CONTRAST    Anatomical Region Laterality Modality   Abdomen -- Computed Tomography   Pelvis -- --     Narrative    EXAM: CT ABDOMEN PELVIS WITH CONTRAST    INDICATION: ICD-10 R10.32 LLQ pain    COMPARISON: CT abdomen pelvis 2022.    TECHNIQUE: CT of the abdomen and pelvis was performed with IV contrast.    FINDINGS: The lung bases are unremarkable.    The liver is grossly unremarkable. There is evidence of cholelithiasis. The kidneys are grossly unremarkable. There is hyperdense contrast in the renal collecting systems which limits evaluation for stones. The pancreas, adrenal glands, and spleen are unremarkable.    The small bowel is normal caliber. No evidence of obstruction. The  appendix is identified and appears normal. There is extensive diverticulosis of the colon without evidence of diverticulitis. There is nonspecific mucosal irregularity of the rectum.    The abdominal aorta is normal caliber. There is moderate atherosclerotic vascular disease. There is no retroperitoneal or pelvic lymphadenopathy.    No lytic or blastic osseus lesions are identified.      IMPRESSION:  1. Colonic diverticulosis without evidence of diverticulitis.  2. Mucosal irregularity of the rectum may relate to peristalsis or underdistention. Mucosal lesion not excluded on CT. Recommend correlation with colonoscopy.  3. Cholelithiasis.    Narrative & Impression   EXAMINATION: US TESTICULAR AND SCROTAL, 11/6/2024 3:01 PM      COMPARISON: None.     HISTORY: penile and testicular pain     TECHNIQUE: The scrotum was scanned in standard fashion with  specialized ultrasound transducer(s) using grey scale, color Doppler,  and spectral flow  techniques.     Findings:  The testes demonstrate normal and symmetric echotexture and  vascularity. No evidence of a focal lesion.  The right testicle  measures 4.4 x 2.8 x 2.2 cm (14.2 mL) and the left measures 4.1 x 3.3  x 2.2 cm (15.6 mL). There is no evidence of torsion.     There is no evidence of a significant hydrocele or varicocele.     Bilateral anechoic avascular cysts of the epididymal head measuring up  to 4 mm on the right and 5 mm on the left.                                                                      Impression:   1.  No acute finding.  2.  Bilateral epididymal head cysts measuring up to 5 mm.        Assessment & Plan   Prostatitis   Urethral pain  High stress  Low fluid intake    It was my pleasure to speak with Jonathon in regards to his presumed prostatitis infections and urethral pain in the setting of a high stress job and low fluid intake.  After reviewing his clinical history as well as his multiple lab and imaging tests, I was happy to hear that that Jonathon  seems to be past the worst of the symptoms.  We reviewed different potential etiologies of his symptoms, specifically noting that there is a blurry line between prostatitis and pelvic floor dysfunction, and we really need to be diligent on asking the right questions and looking at the overall picture to determine which of these is more likely.    We then reviewed his lifestyle in general, and that he does have a relatively high stress job and does not appear to be taking in many fluids during the day or urinating much during the day.  These can raise his risks for both prostatitis and pelvic floor dysfunction, so my first recommendation is that he work on these things specifically to help eliminate potential reasons for his symptoms.  In the future, we could consider trials of tamsulosin as was previously recommended or a repeat course of antibiotics, potentially even up to 6 weeks.  Finally, at some point in the future we may need to consider a referral to pelvic floor physical therapy.    Jonathon asked multiple insightful questions, but ultimately expressed understanding and agreement with the above discussion and plan.  He will set up a MyChart so we can stay in contact that way, and we will determine when follow-up is needed based on if he remains minimally symptomatic or if symptoms get worse.    PLAN  Lifestyle modifications for hydration and timed voiding  Future consideration of tamsulosin or repeat antibiotic usage  Future consideration of referral to pelvic floor physical therapy    SIGNED    Aamir Terry PA-C      I spent a total of 36 minutes spent on the date of the encounter doing chart review, history and exam, documentation, and further activities as noted above.      Again, thank you for allowing me to participate in the care of your patient.      Sincerely,    Aamir Terry PA-C

## 2025-02-22 ENCOUNTER — HEALTH MAINTENANCE LETTER (OUTPATIENT)
Age: 50
End: 2025-02-22

## 2025-03-08 ENCOUNTER — APPOINTMENT (OUTPATIENT)
Dept: MRI IMAGING | Facility: CLINIC | Age: 50
End: 2025-03-08
Attending: EMERGENCY MEDICINE
Payer: COMMERCIAL

## 2025-03-08 ENCOUNTER — HOSPITAL ENCOUNTER (EMERGENCY)
Facility: CLINIC | Age: 50
Discharge: HOME | End: 2025-03-09
Attending: EMERGENCY MEDICINE | Admitting: EMERGENCY MEDICINE
Payer: COMMERCIAL

## 2025-03-08 DIAGNOSIS — R42 DIZZINESS: ICD-10-CM

## 2025-03-08 DIAGNOSIS — H93.13 TINNITUS, BILATERAL: ICD-10-CM

## 2025-03-08 LAB
ALBUMIN SERPL BCG-MCNC: 4.6 G/DL (ref 3.5–5.2)
ALBUMIN UR-MCNC: NEGATIVE MG/DL
ALP SERPL-CCNC: 113 U/L (ref 40–150)
ALT SERPL W P-5'-P-CCNC: 29 U/L (ref 0–70)
ANION GAP SERPL CALCULATED.3IONS-SCNC: 10 MMOL/L (ref 7–15)
APPEARANCE UR: CLEAR
AST SERPL W P-5'-P-CCNC: 31 U/L (ref 0–45)
BASOPHILS # BLD AUTO: 0 10E3/UL (ref 0–0.2)
BASOPHILS NFR BLD AUTO: 0 %
BILIRUB SERPL-MCNC: 0.3 MG/DL
BILIRUB UR QL STRIP: NEGATIVE
BUN SERPL-MCNC: 13.2 MG/DL (ref 6–20)
CALCIUM SERPL-MCNC: 8.3 MG/DL (ref 8.8–10.4)
CHLORIDE SERPL-SCNC: 104 MMOL/L (ref 98–107)
COLOR UR AUTO: ABNORMAL
CREAT SERPL-MCNC: 0.8 MG/DL (ref 0.67–1.17)
EGFRCR SERPLBLD CKD-EPI 2021: >90 ML/MIN/1.73M2
EOSINOPHIL # BLD AUTO: 0.3 10E3/UL (ref 0–0.7)
EOSINOPHIL NFR BLD AUTO: 3 %
ERYTHROCYTE [DISTWIDTH] IN BLOOD BY AUTOMATED COUNT: 12.9 % (ref 10–15)
GLUCOSE SERPL-MCNC: 112 MG/DL (ref 70–99)
GLUCOSE UR STRIP-MCNC: NEGATIVE MG/DL
HCO3 SERPL-SCNC: 23 MMOL/L (ref 22–29)
HCT VFR BLD AUTO: 47.5 % (ref 40–53)
HGB BLD-MCNC: 16.5 G/DL (ref 13.3–17.7)
HGB UR QL STRIP: NEGATIVE
IMM GRANULOCYTES # BLD: 0 10E3/UL
IMM GRANULOCYTES NFR BLD: 0 %
KETONES UR STRIP-MCNC: NEGATIVE MG/DL
LEUKOCYTE ESTERASE UR QL STRIP: NEGATIVE
LYMPHOCYTES # BLD AUTO: 4 10E3/UL (ref 0.8–5.3)
LYMPHOCYTES NFR BLD AUTO: 49 %
MAGNESIUM SERPL-MCNC: 2.2 MG/DL (ref 1.7–2.3)
MCH RBC QN AUTO: 29.5 PG (ref 26.5–33)
MCHC RBC AUTO-ENTMCNC: 34.7 G/DL (ref 31.5–36.5)
MCV RBC AUTO: 85 FL (ref 78–100)
MONOCYTES # BLD AUTO: 0.5 10E3/UL (ref 0–1.3)
MONOCYTES NFR BLD AUTO: 6 %
MUCOUS THREADS #/AREA URNS LPF: PRESENT /LPF
NEUTROPHILS # BLD AUTO: 3.4 10E3/UL (ref 1.6–8.3)
NEUTROPHILS NFR BLD AUTO: 41 %
NITRATE UR QL: NEGATIVE
NRBC # BLD AUTO: 0 10E3/UL
NRBC BLD AUTO-RTO: 0 /100
PH UR STRIP: 5.5 [PH] (ref 5–7)
PLATELET # BLD AUTO: 197 10E3/UL (ref 150–450)
POTASSIUM SERPL-SCNC: 4.1 MMOL/L (ref 3.4–5.3)
PROT SERPL-MCNC: 7.7 G/DL (ref 6.4–8.3)
RBC # BLD AUTO: 5.59 10E6/UL (ref 4.4–5.9)
RBC URINE: <1 /HPF
SODIUM SERPL-SCNC: 137 MMOL/L (ref 135–145)
SP GR UR STRIP: 1.01 (ref 1–1.03)
SQUAMOUS EPITHELIAL: <1 /HPF
TRANSITIONAL EPI: <1 /HPF
UROBILINOGEN UR STRIP-MCNC: NORMAL MG/DL
WBC # BLD AUTO: 8.1 10E3/UL (ref 4–11)
WBC URINE: 1 /HPF

## 2025-03-08 PROCEDURE — 70544 MR ANGIOGRAPHY HEAD W/O DYE: CPT

## 2025-03-08 PROCEDURE — 70553 MRI BRAIN STEM W/O & W/DYE: CPT

## 2025-03-08 PROCEDURE — 81003 URINALYSIS AUTO W/O SCOPE: CPT | Performed by: EMERGENCY MEDICINE

## 2025-03-08 PROCEDURE — 85025 COMPLETE CBC W/AUTO DIFF WBC: CPT | Performed by: EMERGENCY MEDICINE

## 2025-03-08 PROCEDURE — 83735 ASSAY OF MAGNESIUM: CPT | Performed by: EMERGENCY MEDICINE

## 2025-03-08 PROCEDURE — 70553 MRI BRAIN STEM W/O & W/DYE: CPT | Mod: 26 | Performed by: RADIOLOGY

## 2025-03-08 PROCEDURE — 99207 MRA BRAIN (CIRCLE OF WILLIS) W/O CONTRAST: CPT | Mod: 26 | Performed by: RADIOLOGY

## 2025-03-08 PROCEDURE — 84155 ASSAY OF PROTEIN SERUM: CPT | Performed by: EMERGENCY MEDICINE

## 2025-03-08 PROCEDURE — 99284 EMERGENCY DEPT VISIT MOD MDM: CPT | Performed by: EMERGENCY MEDICINE

## 2025-03-08 PROCEDURE — 82310 ASSAY OF CALCIUM: CPT | Performed by: EMERGENCY MEDICINE

## 2025-03-08 PROCEDURE — 36415 COLL VENOUS BLD VENIPUNCTURE: CPT | Performed by: EMERGENCY MEDICINE

## 2025-03-08 PROCEDURE — 70549 MR ANGIOGRAPH NECK W/O&W/DYE: CPT

## 2025-03-08 PROCEDURE — 99285 EMERGENCY DEPT VISIT HI MDM: CPT | Mod: 25 | Performed by: EMERGENCY MEDICINE

## 2025-03-08 PROCEDURE — 70549 MR ANGIOGRAPH NECK W/O&W/DYE: CPT | Mod: 26 | Performed by: RADIOLOGY

## 2025-03-08 RX ORDER — ALLOPURINOL 100 MG/1
100 TABLET ORAL
COMMUNITY
Start: 2025-02-26

## 2025-03-08 RX ORDER — INDAPAMIDE 2.5 MG/1
1.25 TABLET ORAL
COMMUNITY
Start: 2025-02-26

## 2025-03-08 ASSESSMENT — COLUMBIA-SUICIDE SEVERITY RATING SCALE - C-SSRS
6. HAVE YOU EVER DONE ANYTHING, STARTED TO DO ANYTHING, OR PREPARED TO DO ANYTHING TO END YOUR LIFE?: NO
1. IN THE PAST MONTH, HAVE YOU WISHED YOU WERE DEAD OR WISHED YOU COULD GO TO SLEEP AND NOT WAKE UP?: NO
2. HAVE YOU ACTUALLY HAD ANY THOUGHTS OF KILLING YOURSELF IN THE PAST MONTH?: NO

## 2025-03-08 ASSESSMENT — ACTIVITIES OF DAILY LIVING (ADL)
ADLS_ACUITY_SCORE: 41
ADLS_ACUITY_SCORE: 41

## 2025-03-09 VITALS
DIASTOLIC BLOOD PRESSURE: 92 MMHG | OXYGEN SATURATION: 96 % | TEMPERATURE: 97.7 F | RESPIRATION RATE: 16 BRPM | SYSTOLIC BLOOD PRESSURE: 154 MMHG | HEART RATE: 50 BPM

## 2025-03-09 PROCEDURE — A9585 GADOBUTROL INJECTION: HCPCS | Performed by: EMERGENCY MEDICINE

## 2025-03-09 PROCEDURE — 255N000002 HC RX 255 OP 636: Performed by: EMERGENCY MEDICINE

## 2025-03-09 RX ORDER — MECLIZINE HYDROCHLORIDE 25 MG/1
50 TABLET ORAL 2 TIMES DAILY
Qty: 30 TABLET | Refills: 0 | Status: SHIPPED | OUTPATIENT
Start: 2025-03-09

## 2025-03-09 RX ORDER — GADOBUTROL 604.72 MG/ML
10 INJECTION INTRAVENOUS ONCE
Status: COMPLETED | OUTPATIENT
Start: 2025-03-09 | End: 2025-03-09

## 2025-03-09 RX ADMIN — GADOBUTROL 10 ML: 604.72 INJECTION INTRAVENOUS at 00:35

## 2025-03-09 ASSESSMENT — ACTIVITIES OF DAILY LIVING (ADL): ADLS_ACUITY_SCORE: 41

## 2025-03-09 NOTE — ED PROVIDER NOTES
[facility] ([facility])    3/08/25       ED PROVIDER NOTE    History     Chief Complaint   Patient presents with    Tinnitus    Dizziness     The history is provided by the patient and medical records.     Magdaleno Gordillo is a 49 year old male with recently diagnosed HTN and BPPV who presents to the ED with dizziness and tinnitus.  Patient reports 1-2 weeks of disequilibrium type dizziness.  He reports he was evaluated for this by his primary up in [place] and was started on meclizine and referred to PT for vertiginous type dizziness.  He reports that in the past 4-5 days he has developed some tinnitus.  Unclear cause of his symptoms.  He presents to the ED because he would like an MRI done.  No other symptoms noted.    Past Medical History  Past Medical History:   Diagnosis Date    Enlarged prostate     Fatty liver     Gout      Past Surgical History:   Procedure Laterality Date    COLONOSCOPY       allopurinol (ZYLOPRIM) 100 MG tablet  indapamide (LOZOL) 2.5 MG tablet  meclizine (ANTIVERT) 25 MG tablet      Allergies   Allergen Reactions    Ciprofloxacin Other (See Comments)     Suicidal    Penicillins Rash     Family History  No family history on file.  Social History   Social History     Tobacco Use    Smoking status: Every Day     Types: Cigarettes    Smokeless tobacco: Never   Substance Use Topics    Alcohol use: Never    Drug use: Never      A complete review of systems was performed with pertinent positives and negatives noted in the HPI, and all other systems negative.    Physical Exam   BP: (!) 170/85  Pulse: 56  Temp: 98  F (36.7  C)  Resp: 20  SpO2: 97 %    Physical Exam  Vitals and nursing note reviewed.   Constitutional:       General: He is not in acute distress.     Appearance: He is well-developed. He is not diaphoretic.   HENT:      Head: Normocephalic and atraumatic.      Mouth/Throat:      Pharynx: No oropharyngeal exudate.   Eyes:      General: No scleral  icterus.        Right eye: No discharge.         Left eye: No discharge.      Pupils: Pupils are equal, round, and reactive to light.   Cardiovascular:      Rate and Rhythm: Normal rate and regular rhythm.      Heart sounds: Normal heart sounds. No murmur heard.     No friction rub. No gallop.   Pulmonary:      Effort: Pulmonary effort is normal. No respiratory distress.      Breath sounds: Normal breath sounds. No wheezing.   Chest:      Chest wall: No tenderness.   Abdominal:      General: Bowel sounds are normal. There is no distension.      Palpations: Abdomen is soft.      Tenderness: There is no abdominal tenderness.   Musculoskeletal:         General: No tenderness or deformity. Normal range of motion.      Cervical back: Normal range of motion and neck supple.   Skin:     General: Skin is warm and dry.      Coloration: Skin is not pale.      Findings: No erythema or rash.   Neurological:      Mental Status: He is alert and oriented to person, place, and time.      Cranial Nerves: No cranial nerve deficit.         ED Course, Procedures, & Data      Procedures                No results found for any visits on 03/08/25.  Medications - No data to display  Labs Ordered and Resulted from Time of ED Arrival to Time of ED Departure - No data to display  No orders to display              Assessment & Plan    Is a 49-year-old male who presents with dizziness as well as tinnitus.  He has been having 1 week of feeling of spinning and unsteadiness.  Patient has been seen by his primary care physician and worked up for this.  He had head CT as well as lab work and carotid ultrasound.  He was advised to get MRI due to concern for central cause of vertigo.  He has also developed tinnitus.  He has been taking meclizine which he says does help somewhat but he continues to have symptoms.  Repeat lab work shows no acute abnormalities.  MRI shows no acute abnormalities.  I discussed all results with patient.  Findings favor  peripheral vertigo.  He is having some resolved with meclizine we will increase the dosage of this.  We will discharge with return precautions.    I have reviewed the nursing notes. I have reviewed the findings, diagnosis, plan and need for follow up with the patient.    New Prescriptions    No medications on file       Final diagnoses:   None     KAREN, Tarsha Brewer, am serving as a trained medical scribe to document services personally performed by Deven Cunningham DO based on the provider's statements to me on March 8, 2025.  This document has been checked and approved by the attending provider.    IDeven DO, was physically present and have reviewed and verified the accuracy of this note documented by Tarsha Brewer medical scribe.      Deven Cunningham DO  [facility]PART[facility]  3/8/2025     Deven Cunningham DO  03/09/25 0139

## 2025-03-09 NOTE — ED TRIAGE NOTES
Pt ambulatory to triage with CC of tinnitus for 4-5 days, which keeps him awake at night, and also states feeling a sense of dizziness that is not really dizziness, not really vertigo, but describes as a dizzy feeling which gives him anxiety.      Triage Assessment (Adult)       Row Name 03/08/25 4991          Triage Assessment    Airway WDL WDL        Respiratory WDL    Respiratory WDL WDL        Skin Circulation/Temperature WDL    Skin Circulation/Temperature WDL WDL        Cardiac WDL    Cardiac WDL WDL        Peripheral/Neurovascular WDL    Peripheral Neurovascular WDL WDL        Cognitive/Neuro/Behavioral WDL    Cognitive/Neuro/Behavioral WDL WDL

## 2025-08-24 ENCOUNTER — HOSPITAL ENCOUNTER (EMERGENCY)
Facility: CLINIC | Age: 50
Discharge: HOME | End: 2025-08-24
Attending: STUDENT IN AN ORGANIZED HEALTH CARE EDUCATION/TRAINING PROGRAM | Admitting: STUDENT IN AN ORGANIZED HEALTH CARE EDUCATION/TRAINING PROGRAM
Payer: COMMERCIAL

## 2025-08-24 VITALS
OXYGEN SATURATION: 95 % | RESPIRATION RATE: 22 BRPM | DIASTOLIC BLOOD PRESSURE: 65 MMHG | WEIGHT: 294.8 LBS | HEART RATE: 68 BPM | TEMPERATURE: 98.3 F | HEIGHT: 74 IN | SYSTOLIC BLOOD PRESSURE: 108 MMHG | BODY MASS INDEX: 37.83 KG/M2

## 2025-08-24 DIAGNOSIS — N41.0 ACUTE PROSTATITIS: Primary | ICD-10-CM

## 2025-08-24 LAB
ALBUMIN SERPL BCG-MCNC: 4.5 G/DL (ref 3.5–5.2)
ALBUMIN UR-MCNC: 10 MG/DL
ALP SERPL-CCNC: 113 U/L (ref 40–150)
ALT SERPL W P-5'-P-CCNC: 34 U/L (ref 0–70)
ANION GAP SERPL CALCULATED.3IONS-SCNC: 14 MMOL/L (ref 7–15)
APPEARANCE UR: ABNORMAL
AST SERPL W P-5'-P-CCNC: 31 U/L (ref 0–45)
BACTERIA #/AREA URNS HPF: ABNORMAL /HPF
BASOPHILS # BLD MANUAL: 0.19 10E3/UL (ref 0–0.2)
BASOPHILS NFR BLD MANUAL: 0.9 %
BILIRUB SERPL-MCNC: 0.9 MG/DL
BILIRUB UR QL STRIP: NEGATIVE
BUN SERPL-MCNC: 14.5 MG/DL (ref 6–20)
C PNEUM DNA SPEC QL NAA+PROBE: NOT DETECTED
C TRACH DNA SPEC QL NAA+PROBE: NEGATIVE
CALCIUM SERPL-MCNC: 9.7 MG/DL (ref 8.8–10.4)
CHLORIDE SERPL-SCNC: 103 MMOL/L (ref 98–107)
COLOR UR AUTO: YELLOW
CREAT SERPL-MCNC: 1.15 MG/DL (ref 0.67–1.17)
EGFRCR SERPLBLD CKD-EPI 2021: 78 ML/MIN/1.73M2
EOSINOPHIL # BLD MANUAL: 0 10E3/UL (ref 0–0.7)
EOSINOPHIL NFR BLD MANUAL: 0 %
ERYTHROCYTE [DISTWIDTH] IN BLOOD BY AUTOMATED COUNT: 13.4 % (ref 10–15)
FLUAV H1 2009 PAND RNA SPEC QL NAA+PROBE: NOT DETECTED
FLUAV H1 RNA SPEC QL NAA+PROBE: NOT DETECTED
FLUAV H3 RNA SPEC QL NAA+PROBE: NOT DETECTED
FLUAV RNA SPEC QL NAA+PROBE: NOT DETECTED
FLUBV RNA SPEC QL NAA+PROBE: NOT DETECTED
GLUCOSE SERPL-MCNC: 124 MG/DL (ref 70–99)
GLUCOSE UR STRIP-MCNC: NEGATIVE MG/DL
HADV DNA SPEC QL NAA+PROBE: NOT DETECTED
HCO3 SERPL-SCNC: 18 MMOL/L (ref 22–29)
HCOV PNL SPEC NAA+PROBE: NOT DETECTED
HCT VFR BLD AUTO: 48.2 % (ref 40–53)
HGB BLD-MCNC: 16.8 G/DL (ref 13.3–17.7)
HGB UR QL STRIP: ABNORMAL
HMPV RNA SPEC QL NAA+PROBE: NOT DETECTED
HPIV1 RNA SPEC QL NAA+PROBE: NOT DETECTED
HPIV2 RNA SPEC QL NAA+PROBE: NOT DETECTED
HPIV3 RNA SPEC QL NAA+PROBE: NOT DETECTED
HPIV4 RNA SPEC QL NAA+PROBE: NOT DETECTED
HYALINE CASTS: 1 /LPF
KETONES UR STRIP-MCNC: NEGATIVE MG/DL
LEUKOCYTE ESTERASE UR QL STRIP: ABNORMAL
LYMPHOCYTES # BLD MANUAL: 2.47 10E3/UL (ref 0.8–5.3)
LYMPHOCYTES NFR BLD MANUAL: 11.1 %
M PNEUMO DNA SPEC QL NAA+PROBE: NOT DETECTED
MCH RBC QN AUTO: 29.7 PG (ref 26.5–33)
MCHC RBC AUTO-ENTMCNC: 34.9 G/DL (ref 31.5–36.5)
MCV RBC AUTO: 85.2 FL (ref 78–100)
MONOCYTES # BLD MANUAL: 0.76 10E3/UL (ref 0–1.3)
MONOCYTES NFR BLD MANUAL: 3.4 %
MUCOUS THREADS #/AREA URNS LPF: PRESENT /LPF
N GONORRHOEA DNA SPEC QL NAA+PROBE: NEGATIVE
NEUTROPHILS # BLD MANUAL: 18.8 10E3/UL (ref 1.6–8.3)
NEUTROPHILS NFR BLD MANUAL: 84.6 %
NITRATE UR QL: POSITIVE
PH UR STRIP: 5.5 [PH] (ref 5–7)
PLAT MORPH BLD: NORMAL
PLATELET # BLD AUTO: 167 10E3/UL (ref 150–450)
POTASSIUM SERPL-SCNC: 4.6 MMOL/L (ref 3.4–5.3)
PROT SERPL-MCNC: 7.9 G/DL (ref 6.4–8.3)
RBC # BLD AUTO: 5.66 10E6/UL (ref 4.4–5.9)
RBC MORPH BLD: NORMAL
RBC URINE: 6 /HPF
RSV RNA SPEC QL NAA+PROBE: NOT DETECTED
RSV RNA SPEC QL NAA+PROBE: NOT DETECTED
RV+EV RNA SPEC QL NAA+PROBE: NOT DETECTED
SODIUM SERPL-SCNC: 135 MMOL/L (ref 135–145)
SP GR UR STRIP: 1.02 (ref 1–1.03)
SPECIMEN TYPE: NORMAL
SPECIMEN TYPE: NORMAL
SQUAMOUS EPITHELIAL: <1 /HPF
UROBILINOGEN UR STRIP-MCNC: NORMAL MG/DL
WBC # BLD AUTO: 22.22 10E3/UL (ref 4–11)
WBC URINE: 138 /HPF

## 2025-08-24 PROCEDURE — 82310 ASSAY OF CALCIUM: CPT | Performed by: STUDENT IN AN ORGANIZED HEALTH CARE EDUCATION/TRAINING PROGRAM

## 2025-08-24 PROCEDURE — 99284 EMERGENCY DEPT VISIT MOD MDM: CPT | Performed by: STUDENT IN AN ORGANIZED HEALTH CARE EDUCATION/TRAINING PROGRAM

## 2025-08-24 PROCEDURE — 87186 SC STD MICRODIL/AGAR DIL: CPT | Performed by: STUDENT IN AN ORGANIZED HEALTH CARE EDUCATION/TRAINING PROGRAM

## 2025-08-24 PROCEDURE — 99284 EMERGENCY DEPT VISIT MOD MDM: CPT | Mod: 25 | Performed by: STUDENT IN AN ORGANIZED HEALTH CARE EDUCATION/TRAINING PROGRAM

## 2025-08-24 PROCEDURE — 87491 CHLMYD TRACH DNA AMP PROBE: CPT | Performed by: STUDENT IN AN ORGANIZED HEALTH CARE EDUCATION/TRAINING PROGRAM

## 2025-08-24 PROCEDURE — 96365 THER/PROPH/DIAG IV INF INIT: CPT | Performed by: STUDENT IN AN ORGANIZED HEALTH CARE EDUCATION/TRAINING PROGRAM

## 2025-08-24 PROCEDURE — 85007 BL SMEAR W/DIFF WBC COUNT: CPT | Performed by: STUDENT IN AN ORGANIZED HEALTH CARE EDUCATION/TRAINING PROGRAM

## 2025-08-24 PROCEDURE — 250N000011 HC RX IP 250 OP 636: Performed by: STUDENT IN AN ORGANIZED HEALTH CARE EDUCATION/TRAINING PROGRAM

## 2025-08-24 PROCEDURE — 250N000013 HC RX MED GY IP 250 OP 250 PS 637: Performed by: STUDENT IN AN ORGANIZED HEALTH CARE EDUCATION/TRAINING PROGRAM

## 2025-08-24 PROCEDURE — 36415 COLL VENOUS BLD VENIPUNCTURE: CPT | Performed by: STUDENT IN AN ORGANIZED HEALTH CARE EDUCATION/TRAINING PROGRAM

## 2025-08-24 PROCEDURE — 85014 HEMATOCRIT: CPT | Performed by: STUDENT IN AN ORGANIZED HEALTH CARE EDUCATION/TRAINING PROGRAM

## 2025-08-24 PROCEDURE — 258N000003 HC RX IP 258 OP 636: Performed by: STUDENT IN AN ORGANIZED HEALTH CARE EDUCATION/TRAINING PROGRAM

## 2025-08-24 PROCEDURE — 87591 N.GONORRHOEAE DNA AMP PROB: CPT | Performed by: STUDENT IN AN ORGANIZED HEALTH CARE EDUCATION/TRAINING PROGRAM

## 2025-08-24 PROCEDURE — 87633 RESP VIRUS 12-25 TARGETS: CPT | Performed by: STUDENT IN AN ORGANIZED HEALTH CARE EDUCATION/TRAINING PROGRAM

## 2025-08-24 PROCEDURE — 96375 TX/PRO/DX INJ NEW DRUG ADDON: CPT | Performed by: STUDENT IN AN ORGANIZED HEALTH CARE EDUCATION/TRAINING PROGRAM

## 2025-08-24 PROCEDURE — 81001 URINALYSIS AUTO W/SCOPE: CPT | Performed by: STUDENT IN AN ORGANIZED HEALTH CARE EDUCATION/TRAINING PROGRAM

## 2025-08-24 PROCEDURE — 96361 HYDRATE IV INFUSION ADD-ON: CPT | Performed by: STUDENT IN AN ORGANIZED HEALTH CARE EDUCATION/TRAINING PROGRAM

## 2025-08-24 RX ORDER — CEFTRIAXONE 1 G/1
1 INJECTION, POWDER, FOR SOLUTION INTRAMUSCULAR; INTRAVENOUS ONCE
Status: COMPLETED | OUTPATIENT
Start: 2025-08-24 | End: 2025-08-24

## 2025-08-24 RX ORDER — SULFAMETHOXAZOLE AND TRIMETHOPRIM 800; 160 MG/1; MG/1
1 TABLET ORAL 2 TIMES DAILY
Qty: 28 TABLET | Refills: 0 | Status: SHIPPED | OUTPATIENT
Start: 2025-08-24 | End: 2025-08-24

## 2025-08-24 RX ORDER — TAMSULOSIN HYDROCHLORIDE 0.4 MG/1
0.4 CAPSULE ORAL EVERY EVENING
Qty: 30 CAPSULE | Refills: 0 | Status: SHIPPED | OUTPATIENT
Start: 2025-08-24 | End: 2025-09-23

## 2025-08-24 RX ORDER — CIPROFLOXACIN 500 MG/1
500 TABLET, FILM COATED ORAL 2 TIMES DAILY
Qty: 28 TABLET | Refills: 0 | Status: SHIPPED | OUTPATIENT
Start: 2025-08-24 | End: 2025-09-07

## 2025-08-24 RX ORDER — KETOROLAC TROMETHAMINE 15 MG/ML
15 INJECTION, SOLUTION INTRAMUSCULAR; INTRAVENOUS ONCE
Status: COMPLETED | OUTPATIENT
Start: 2025-08-24 | End: 2025-08-24

## 2025-08-24 RX ORDER — ACETAMINOPHEN 500 MG
1000 TABLET ORAL ONCE
Status: COMPLETED | OUTPATIENT
Start: 2025-08-24 | End: 2025-08-24

## 2025-08-24 RX ADMIN — CEFTRIAXONE SODIUM 1 G: 1 INJECTION, POWDER, FOR SOLUTION INTRAMUSCULAR; INTRAVENOUS at 13:44

## 2025-08-24 RX ADMIN — ACETAMINOPHEN 1000 MG: 500 TABLET ORAL at 11:37

## 2025-08-24 RX ADMIN — KETOROLAC TROMETHAMINE 15 MG: 15 INJECTION, SOLUTION INTRAMUSCULAR; INTRAVENOUS at 11:41

## 2025-08-24 RX ADMIN — SODIUM CHLORIDE, SODIUM LACTATE, POTASSIUM CHLORIDE, AND CALCIUM CHLORIDE 1000 ML: .6; .31; .03; .02 INJECTION, SOLUTION INTRAVENOUS at 11:41

## 2025-08-24 ASSESSMENT — COLUMBIA-SUICIDE SEVERITY RATING SCALE - C-SSRS
6. HAVE YOU EVER DONE ANYTHING, STARTED TO DO ANYTHING, OR PREPARED TO DO ANYTHING TO END YOUR LIFE?: NO
2. HAVE YOU ACTUALLY HAD ANY THOUGHTS OF KILLING YOURSELF IN THE PAST MONTH?: NO
1. IN THE PAST MONTH, HAVE YOU WISHED YOU WERE DEAD OR WISHED YOU COULD GO TO SLEEP AND NOT WAKE UP?: NO

## 2025-08-24 ASSESSMENT — ACTIVITIES OF DAILY LIVING (ADL)
ADLS_ACUITY_SCORE: 41

## 2025-08-25 ENCOUNTER — PATIENT OUTREACH (OUTPATIENT)
Dept: CARE COORDINATION | Facility: CLINIC | Age: 50
End: 2025-08-25
Payer: COMMERCIAL

## 2025-08-25 LAB — BACTERIA UR CULT: ABNORMAL

## 2025-08-26 ENCOUNTER — TELEPHONE (OUTPATIENT)
Dept: NURSING | Facility: CLINIC | Age: 50
End: 2025-08-26
Payer: COMMERCIAL

## 2025-08-27 ENCOUNTER — TELEPHONE (OUTPATIENT)
Dept: NURSING | Facility: CLINIC | Age: 50
End: 2025-08-27
Payer: COMMERCIAL

## 2025-08-27 ENCOUNTER — PATIENT OUTREACH (OUTPATIENT)
Dept: CARE COORDINATION | Facility: CLINIC | Age: 50
End: 2025-08-27
Payer: COMMERCIAL